# Patient Record
(demographics unavailable — no encounter records)

---

## 2025-01-06 NOTE — ASSESSMENT
[Diabetes Foot Care] : diabetes foot care [Long Term Vascular Complications] : long term vascular complications of diabetes [Carbohydrate Consistent Diet] : carbohydrate consistent diet [Importance of Diet and Exercise] : importance of diet and exercise to improve glycemic control, achieve weight loss and improve cardiovascular health [Exercise/Effect on Glucose] : exercise/effect on glucose [Hypoglycemia Management] : hypoglycemia management [Glucagon Use] : glucagon use [Self Monitoring of Blood Glucose] : self monitoring of blood glucose [Retinopathy Screening] : Patient was referred to ophthalmology for retinopathy screening [Diabetic Medications] : Risks and benefits of diabetic medications were discussed [FreeTextEntry1] : 1. DM2, well controlled - great improvement clinically on the new regimen - she does not want to do personal cgms - metformin 1000 mg bid, Rybelsus 14 mg - Jardiance 10 mg qd - Lisinopril 2.5 mg qd - myalgia on Lipitor- resolved. Cont Crestor 5 mg HS and continue CoQ10 - labs today - Gabapentin 300 mg HS. if no help, will switch to Cymbalta  2. MNG - b/l subcm nodules, overall stable - repeat thyroid US in about 3 years (01/2026)  RTC 4-6 months

## 2025-01-06 NOTE — HISTORY OF PRESENT ILLNESS
[FreeTextEntry1] : F/u for diabetes management  *** Jan 06, 2025 ***  feels great. lost more weight- happy with her progress staying on metformin 1000 mg bid, Rybelsus 14 mg,  Jardiance 10 mg qd,   Lisinopril 2.5 mg qd, Gabapentin 300 mg HS, Crestor 5 mg HS with CoQ10 no myalgia  DXA(5/20/24)- LS ( 0.1), Left femoral neck ( 0.1) , Left total hip T-score (-0.1)   *** Aug 28, 2024 ***  came off Lexapro, switched to Wellbutrin- feels well, and was able to lose the weight staying on metformin 1000 mg bid, Rybelsus 14 mg,  Jardiance 10 mg qd,   Lisinopril 2.5 mg qd, Gabapentin 300 mg HS.  started taking Crestor 5 mg HS with CoQ10 - no myalgia no recent labs,  not monitoring FS at home today ppg in the office- 152  *** May 23, 2024 ***  started on Lexapro 3 months ago for anxiety. Gained about 10 lbs since then, despite staying on a strict diet remains on  metformin 1000 mg bid, Rybelsus 14 mg,  Jardiance 10 mg qd,   Lisinopril 2.5 mg qd, Gabapentin 300 mg HS.  labs from February reviewed as below off Lipitor , taking CoQ10- myalgia resolved a1c- 5.4%, LDL- 154 not monitoring FS  *** Dec 18, 2023 ***  doing well overall, except for worsening generalized myalgia ( upper back, arms, legs) for the past couple of months maintains her weight staying on metformin 1000 mg bid, Rybelsus 14 mg,  Jardiance 10 mg qd,  Lipitor 20 mg, Lisinopril 2.5 mg qd, Gabapentin 300 mg HS.  stopped ALA b/o stomach issues not checking her FS  *** Mar 27, 2023 ***  feels well overall, except for a neuropathy (nighttime paraesthesias). prev tried neurontin but felt sleepy during the day taking metformin 1000 mg bid,  Rybelsus 14 mg,  Jardiance 10 mg qd,  Lipitor 20 mg, Lisinopril 2.5 mg qd did not start ALA yet last a1c- 5.7%  Thyroid US (1/25/23)- RUP calcified nodule 0.5x0.3x0.3 and another 0.5x0.5x0.3 nodule. all are stable  *** Nov 21, 2022 ***  under stress, father passed away regained some weight taking metformin 1000 mg bid,  Rybelsus 14 mg,  Jardiance 10 mg qd (off for several days b/o was not in stock),  Lipitor 20 mg, Lisinopril 2.5 mg qd Not testing her blood sugar No recent blood work, most recent A1c from August-5.9%  *** Jul 21, 2022 ***  feels great, denies any c/o lost more weight - total weight loss > 50 lbs taking  metformin 1000 mg bid, Rybelsus 14 mg, Jardiance 10 mg qd,  Lipitor 20 mg, Lisinopril 2.5 mg qd stopped checking her FS  *** Feb 10, 2022 ***  feels great. Lost more than 30 lbs taking metformin 1000 mg bid,  Rybelsus 7 mg,  Jardiance 10 mg qd,  Lipitor 20 mg, Lisinopril 2.5 mg qd reports fbs- 110's, not checking other times  Thyr US (9/2/21)- RUP calcific nodule 0.3x0.5x0.3 and RMP iso 0.5x0.3x0.5, LLP hypo 0.2x0.2x0.3  HISTORY OF PRESENT ILLNESS.   Ms. NAVARRO was diagnosed with Diabetes Mellitus Type 2 in 2017. She reports history HTN, dyslipidemia, possible peripheral diabetic neuropathy, and denies CAD,  known complications of retinopathy, nephropathy. She is presently on metformin 1000 mg bid, lipitor 20 mg HS, lisinopril 2.5 mg. She had glipizide added , but felt very tired on it and stopped. She was briefly on gabapentin, but is not sure why she stopped it.  Blood sugars are not checked  at home Fingerstick glucose in the office today is 156 mg/dL 2 hours after eating.  Diet: not following ADA Exercise: none. Retired.   Lab review: a1c- 8.4  Last dilated eye - 09/24, to see again tomorrow Last podiatry visit  - 2019 Last cardiology evaluation - none Last stress test - none Last 2-D Echo -none Last nephrology evaluation - none Last neurology evaluation- 2018

## 2025-06-24 NOTE — ASSESSMENT
[Diabetes Foot Care] : diabetes foot care [Long Term Vascular Complications] : long term vascular complications of diabetes [Carbohydrate Consistent Diet] : carbohydrate consistent diet [Importance of Diet and Exercise] : importance of diet and exercise to improve glycemic control, achieve weight loss and improve cardiovascular health [Exercise/Effect on Glucose] : exercise/effect on glucose [Hypoglycemia Management] : hypoglycemia management [Glucagon Use] : glucagon use [Self Monitoring of Blood Glucose] : self monitoring of blood glucose [Retinopathy Screening] : Patient was referred to ophthalmology for retinopathy screening [Diabetic Medications] : Risks and benefits of diabetic medications were discussed [FreeTextEntry1] : 1. DM2, well controlled - she does not want to do personal cgms - metformin 1000 mg bid, Rybelsus 14 mg - Jardiance 10 mg qd - resume home BP monitoring, and if BP is elevated, can resume Lisinopril 2.5 mg qd - myalgia on Lipitor- resolved. Cont Crestor 5 mg HS and continue CoQ10 - labs today - Gabapentin 300 mg HS. if no help, will switch to Cymbalta  2. MNG - b/l subcm nodules, overall stable - repeat thyroid US in about 3 years (01/2026)  RTC 4-6 months

## 2025-06-24 NOTE — HISTORY OF PRESENT ILLNESS
[FreeTextEntry1] : F/u for diabetes management  *** Jun 24, 2025 ***  lost some weight, but then regained some, but staying stable overall doing fine, offers no new c/o staying on metformin 1000 mg bid, Rybelsus 14 mg,  Jardiance 10 mg qd,    Gabapentin 300 mg HS, Crestor 5 mg HS with CoQ10 off Lisinopril (initially b/o some lip swelling, but felt it wasn't the reason, then stopped b/o BP was fine) not checking FS at home fbs today- 106  *** Jan 06, 2025 ***  feels great. lost more weight- happy with her progress staying on metformin 1000 mg bid, Rybelsus 14 mg,  Jardiance 10 mg qd,   Lisinopril 2.5 mg qd, Gabapentin 300 mg HS, Crestor 5 mg HS with CoQ10 no myalgia  DXA(5/20/24)- LS ( 0.1), Left femoral neck ( 0.1) , Left total hip T-score (-0.1)   *** Aug 28, 2024 ***  came off Lexapro, switched to Wellbutrin- feels well, and was able to lose the weight staying on metformin 1000 mg bid, Rybelsus 14 mg,  Jardiance 10 mg qd,   Lisinopril 2.5 mg qd, Gabapentin 300 mg HS.  started taking Crestor 5 mg HS with CoQ10 - no myalgia no recent labs,  not monitoring FS at home today ppg in the office- 152  *** May 23, 2024 ***  started on Lexapro 3 months ago for anxiety. Gained about 10 lbs since then, despite staying on a strict diet remains on  metformin 1000 mg bid, Rybelsus 14 mg,  Jardiance 10 mg qd,   Lisinopril 2.5 mg qd, Gabapentin 300 mg HS.  labs from February reviewed as below off Lipitor , taking CoQ10- myalgia resolved a1c- 5.4%, LDL- 154 not monitoring FS  *** Dec 18, 2023 ***  doing well overall, except for worsening generalized myalgia ( upper back, arms, legs) for the past couple of months maintains her weight staying on metformin 1000 mg bid, Rybelsus 14 mg,  Jardiance 10 mg qd,  Lipitor 20 mg, Lisinopril 2.5 mg qd, Gabapentin 300 mg HS.  stopped ALA b/o stomach issues not checking her FS  *** Mar 27, 2023 ***  feels well overall, except for a neuropathy (nighttime paraesthesias). prev tried neurontin but felt sleepy during the day taking metformin 1000 mg bid,  Rybelsus 14 mg,  Jardiance 10 mg qd,  Lipitor 20 mg, Lisinopril 2.5 mg qd did not start ALA yet last a1c- 5.7%  Thyroid US (1/25/23)- RUP calcified nodule 0.5x0.3x0.3 and another 0.5x0.5x0.3 nodule. all are stable  *** Nov 21, 2022 ***  under stress, father passed away regained some weight taking metformin 1000 mg bid,  Rybelsus 14 mg,  Jardiance 10 mg qd (off for several days b/o was not in stock),  Lipitor 20 mg, Lisinopril 2.5 mg qd Not testing her blood sugar No recent blood work, most recent A1c from August-5.9%  *** Jul 21, 2022 ***  feels great, denies any c/o lost more weight - total weight loss > 50 lbs taking  metformin 1000 mg bid, Rybelsus 14 mg, Jardiance 10 mg qd,  Lipitor 20 mg, Lisinopril 2.5 mg qd stopped checking her FS  *** Feb 10, 2022 ***  feels great. Lost more than 30 lbs taking metformin 1000 mg bid,  Rybelsus 7 mg,  Jardiance 10 mg qd,  Lipitor 20 mg, Lisinopril 2.5 mg qd reports fbs- 110's, not checking other times  Thyr US (9/2/21)- RUP calcific nodule 0.3x0.5x0.3 and RMP iso 0.5x0.3x0.5, LLP hypo 0.2x0.2x0.3  HISTORY OF PRESENT ILLNESS.   Ms. NAVARRO was diagnosed with Diabetes Mellitus Type 2 in 2017. She reports history HTN, dyslipidemia, possible peripheral diabetic neuropathy, and denies CAD,  known complications of retinopathy, nephropathy. She is presently on metformin 1000 mg bid, lipitor 20 mg HS, lisinopril 2.5 mg. She had glipizide added , but felt very tired on it and stopped. She was briefly on gabapentin, but is not sure why she stopped it.  Blood sugars are not checked  at home Fingerstick glucose in the office today is 156 mg/dL 2 hours after eating.  Diet: not following ADA Exercise: none. Retired.   Lab review: a1c- 8.4  Last dilated eye - 04/25, saw retina specialist in 05/25 Last podiatry visit  - 2019 Last cardiology evaluation - none Last stress test - none Last 2-D Echo -none Last nephrology evaluation - none Last neurology evaluation- 2018